# Patient Record
Sex: MALE | Race: BLACK OR AFRICAN AMERICAN | NOT HISPANIC OR LATINO | Employment: UNEMPLOYED | URBAN - METROPOLITAN AREA
[De-identification: names, ages, dates, MRNs, and addresses within clinical notes are randomized per-mention and may not be internally consistent; named-entity substitution may affect disease eponyms.]

---

## 2023-04-06 ENCOUNTER — HOSPITAL ENCOUNTER (EMERGENCY)
Facility: HOSPITAL | Age: 33
Discharge: OTHER INSTITUTION | End: 2023-04-07
Attending: EMERGENCY MEDICINE

## 2023-04-06 ENCOUNTER — APPOINTMENT (OUTPATIENT)
Dept: CT IMAGING | Facility: HOSPITAL | Age: 33
End: 2023-04-06

## 2023-04-06 DIAGNOSIS — R53.1 ACUTE LEFT-SIDED WEAKNESS: Primary | ICD-10-CM

## 2023-04-06 DIAGNOSIS — I61.9 INTRACEREBRAL HEMORRHAGE (HCC): ICD-10-CM

## 2023-04-06 LAB
ANION GAP SERPL CALCULATED.3IONS-SCNC: 10 MMOL/L (ref 4–13)
APTT PPP: 26 SECONDS (ref 23–37)
BUN SERPL-MCNC: 15 MG/DL (ref 5–25)
CALCIUM SERPL-MCNC: 9.2 MG/DL (ref 8.4–10.2)
CHLORIDE SERPL-SCNC: 101 MMOL/L (ref 96–108)
CO2 SERPL-SCNC: 24 MMOL/L (ref 21–32)
CREAT SERPL-MCNC: 1.23 MG/DL (ref 0.6–1.3)
ERYTHROCYTE [DISTWIDTH] IN BLOOD BY AUTOMATED COUNT: 14.2 % (ref 11.6–15.1)
ETHANOL SERPL-MCNC: <10 MG/DL
GFR SERPL CREATININE-BSD FRML MDRD: 76 ML/MIN/1.73SQ M
GLUCOSE SERPL-MCNC: 103 MG/DL (ref 65–140)
HCT VFR BLD AUTO: 47.6 % (ref 36.5–49.3)
HGB BLD-MCNC: 15.5 G/DL (ref 12–17)
INR PPP: 1.06 (ref 0.84–1.19)
MCH RBC QN AUTO: 26.8 PG (ref 26.8–34.3)
MCHC RBC AUTO-ENTMCNC: 32.6 G/DL (ref 31.4–37.4)
MCV RBC AUTO: 82 FL (ref 82–98)
PLATELET # BLD AUTO: 156 THOUSANDS/UL (ref 149–390)
PMV BLD AUTO: 12.3 FL (ref 8.9–12.7)
POTASSIUM SERPL-SCNC: 3.1 MMOL/L (ref 3.5–5.3)
PROTHROMBIN TIME: 13.6 SECONDS (ref 11.6–14.5)
RBC # BLD AUTO: 5.78 MILLION/UL (ref 3.88–5.62)
SODIUM SERPL-SCNC: 135 MMOL/L (ref 135–147)
WBC # BLD AUTO: 12.67 THOUSAND/UL (ref 4.31–10.16)

## 2023-04-06 RX ORDER — HYDRALAZINE HYDROCHLORIDE 20 MG/ML
10 INJECTION INTRAMUSCULAR; INTRAVENOUS ONCE
Status: COMPLETED | OUTPATIENT
Start: 2023-04-07 | End: 2023-04-07

## 2023-04-06 RX ORDER — FENTANYL CITRATE 50 UG/ML
50 INJECTION, SOLUTION INTRAMUSCULAR; INTRAVENOUS ONCE
Status: DISCONTINUED | OUTPATIENT
Start: 2023-04-07 | End: 2023-04-07 | Stop reason: HOSPADM

## 2023-04-06 RX ORDER — ONDANSETRON 2 MG/ML
4 INJECTION INTRAMUSCULAR; INTRAVENOUS ONCE
Status: COMPLETED | OUTPATIENT
Start: 2023-04-06 | End: 2023-04-06

## 2023-04-06 RX ORDER — LABETALOL HYDROCHLORIDE 5 MG/ML
10 INJECTION, SOLUTION INTRAVENOUS ONCE
Status: COMPLETED | OUTPATIENT
Start: 2023-04-06 | End: 2023-04-06

## 2023-04-06 RX ADMIN — LEVETIRACETAM 2000 MG: 100 INJECTION, SOLUTION INTRAVENOUS at 23:49

## 2023-04-06 RX ADMIN — ONDANSETRON 4 MG: 2 INJECTION INTRAMUSCULAR; INTRAVENOUS at 23:44

## 2023-04-06 RX ADMIN — LABETALOL HYDROCHLORIDE 10 MG: 5 INJECTION, SOLUTION INTRAVENOUS at 23:44

## 2023-04-06 RX ADMIN — SODIUM CHLORIDE 1 MG/HR: 0.9 INJECTION, SOLUTION INTRAVENOUS at 23:38

## 2023-04-07 ENCOUNTER — APPOINTMENT (EMERGENCY)
Dept: RADIOLOGY | Facility: HOSPITAL | Age: 33
End: 2023-04-07

## 2023-04-07 ENCOUNTER — APPOINTMENT (EMERGENCY)
Dept: CT IMAGING | Facility: HOSPITAL | Age: 33
End: 2023-04-07

## 2023-04-07 VITALS
HEART RATE: 94 BPM | RESPIRATION RATE: 16 BRPM | WEIGHT: 221.56 LBS | SYSTOLIC BLOOD PRESSURE: 231 MMHG | DIASTOLIC BLOOD PRESSURE: 144 MMHG | OXYGEN SATURATION: 98 %

## 2023-04-07 PROBLEM — I61.9 ICH (INTRACEREBRAL HEMORRHAGE) (HCC): Status: ACTIVE | Noted: 2023-04-07

## 2023-04-07 LAB
ATRIAL RATE: 97 BPM
CARDIAC TROPONIN I PNL SERPL HS: 41 NG/L
FLUAV RNA RESP QL NAA+PROBE: NEGATIVE
FLUBV RNA RESP QL NAA+PROBE: NEGATIVE
P AXIS: 64 DEGREES
PR INTERVAL: 142 MS
QRS AXIS: 65 DEGREES
QRSD INTERVAL: 82 MS
QT INTERVAL: 352 MS
QTC INTERVAL: 447 MS
RSV RNA RESP QL NAA+PROBE: NEGATIVE
SARS-COV-2 RNA RESP QL NAA+PROBE: NEGATIVE
T WAVE AXIS: -27 DEGREES
VENTRICULAR RATE: 97 BPM

## 2023-04-07 RX ORDER — ETOMIDATE 2 MG/ML
20 INJECTION INTRAVENOUS ONCE
Status: COMPLETED | OUTPATIENT
Start: 2023-04-07 | End: 2023-04-07

## 2023-04-07 RX ORDER — LABETALOL HYDROCHLORIDE 5 MG/ML
10 INJECTION, SOLUTION INTRAVENOUS ONCE
Status: COMPLETED | OUTPATIENT
Start: 2023-04-07 | End: 2023-04-07

## 2023-04-07 RX ORDER — LABETALOL HYDROCHLORIDE 5 MG/ML
INJECTION, SOLUTION INTRAVENOUS
Status: COMPLETED
Start: 2023-04-07 | End: 2023-04-07

## 2023-04-07 RX ORDER — PROPOFOL 10 MG/ML
5-50 INJECTION, EMULSION INTRAVENOUS
Status: DISCONTINUED | OUTPATIENT
Start: 2023-04-07 | End: 2023-04-07 | Stop reason: HOSPADM

## 2023-04-07 RX ORDER — HYDRALAZINE HYDROCHLORIDE 20 MG/ML
10 INJECTION INTRAMUSCULAR; INTRAVENOUS ONCE
Status: COMPLETED | OUTPATIENT
Start: 2023-04-07 | End: 2023-04-07

## 2023-04-07 RX ORDER — SUCCINYLCHOLINE/SOD CL,ISO/PF 100 MG/5ML
100 SYRINGE (ML) INTRAVENOUS ONCE
Status: COMPLETED | OUTPATIENT
Start: 2023-04-07 | End: 2023-04-07

## 2023-04-07 RX ORDER — SODIUM CHLORIDE 3 G/100ML
250 INJECTION, SOLUTION INTRAVENOUS ONCE
Status: DISCONTINUED | OUTPATIENT
Start: 2023-04-07 | End: 2023-04-07

## 2023-04-07 RX ADMIN — PROPOFOL 30 MCG/KG/MIN: 10 INJECTION, EMULSION INTRAVENOUS at 01:02

## 2023-04-07 RX ADMIN — SODIUM CHLORIDE 15 MG/HR: 0.9 INJECTION, SOLUTION INTRAVENOUS at 01:48

## 2023-04-07 RX ADMIN — LABETALOL HYDROCHLORIDE 10 MG: 5 INJECTION, SOLUTION INTRAVENOUS at 01:55

## 2023-04-07 RX ADMIN — LABETALOL HYDROCHLORIDE 10 MG: 5 INJECTION, SOLUTION INTRAVENOUS at 00:30

## 2023-04-07 RX ADMIN — HYDRALAZINE HYDROCHLORIDE 10 MG: 20 INJECTION INTRAMUSCULAR; INTRAVENOUS at 00:34

## 2023-04-07 RX ADMIN — HYDRALAZINE HYDROCHLORIDE 10 MG: 20 INJECTION INTRAMUSCULAR; INTRAVENOUS at 00:36

## 2023-04-07 RX ADMIN — Medication 100 MG: at 01:02

## 2023-04-07 RX ADMIN — ETOMIDATE 20 MG: 2 INJECTION, SOLUTION INTRAVENOUS at 01:02

## 2023-04-07 NOTE — ED PROVIDER NOTES
Pt Name: Frederick Fuller  MRN: [id number]  Emilygfgautam 1990  Age/Sex: 27 y o  male  Date of evaluation: 4/6/2023  PCP: No primary care provider on file  CHIEF COMPLAINT    Chief Complaint   Patient presents with   • Fall     Pt arrives via EMS complaining of new onset left sided weakness, pt reports falling at home because of the numbness, Pre hospital stroke alert called, went to CT right away and then found out pt fell, Activating trauma instead per provider  HPI and MDM    27 y o  male presenting with left-sided weakness and dysarthria  Significant other present with patient, patient presents via EMS  Per EMS initially he was able to somewhat move his left arm but throughout transport he had worsening weakness  Per significant other, symptoms started between 8 and 9 PM earlier tonight  His left side got weak and numb and then he fell onto the ground, did hit the right side of his head  He had been drinking  No vomiting  No recent illnesses  Patient has a history of high blood pressure, is on metoprolol, losartan and amlodipine but has not taken it for the last few months  Differential diagnosis considered includes but not limited to acute CVA, large vessel occlusion, dissection, intracerebral hemorrhage  Patient was examined as soon as he came through the emergency department, he had no effort against gravity in both left upper and lower extremities, and significant dysarthria, however GCS 15  He was immediately sent to CT scanner for stroke scans  I discussed case with stroke neurologist, Dr Juan Daniel Willis, he reviewed CT scans, patient has large right intracerebral hemorrhage and small left intracerebral hemorrhage  Initially discussed this with trauma given patient had a fall, however this is likely medical, likely hypertensive bleed that caused the fall  Trauma recommending neuro critical care  Stroke neurology in agreement      I discussed case with neuro critical care attending,   KUSHAL, who agrees with transfer  Recommended to keep systolic blood pressure less than 140, patient being given IV labetalol and started on a Cardene drip  I discussed results and plan with patient and significant other  ED Course as of 04/07/23 0205   Fri Apr 07, 2023   0113 Flight crew at bedside, with somewhat worse somnolence however, still arouses to verbal stimuli, does answer questions appropriately, knows where he is and his name  Flight crew requesting intubation for transport due to potential clinical course deteriorating, therefore patient was intubated  I updated neuro critical care attending, Dr Ann-Marie Stevenson, who is requesting repeat CT head, therefore it is ordered  0123 XR chest 1 view portable  Per my independent interpretation of CXR, good placement of ETT, no pneumothorax or pneumonia  7986 Dr Ann-Marie Stevenson reviewed repeat CT head, recommending Hypertonic bolus 250 mL  Flight crew able to administer en route  Medications   niCARdipine (CARDENE) 25 mg (STANDARD CONCENTRATION) in sodium chloride 0 9% 250 mL (has no administration in time range)   levETIRAcetam (KEPPRA) 2,000 mg in sodium chloride 0 9 % 250 mL IVPB (has no administration in time range)   labetalol (NORMODYNE) injection 10 mg (has no administration in time range)         Past Medical and Surgical History    History reviewed  No pertinent past medical history  History reviewed  No pertinent surgical history  History reviewed  No pertinent family history               Allergies    No Known Allergies    Home Medications    Prior to Admission medications    Not on File           Physical Exam      ED Triage Vitals [04/06/23 2332]   Temp Pulse Respirations Blood Pressure SpO2   -- (!) 118 20 (!) 205/153 98 %      Temp src Heart Rate Source Patient Position - Orthostatic VS BP Location FiO2 (%)   -- Monitor Sitting Right arm --      Pain Score       10 - Worst Possible Pain               Physical Exam         Diagnostic Results      Labs:    Results Reviewed     Procedure Component Value Units Date/Time    Protime-INR [[id number]] Collected: 04/06/23 2336    Lab Status: In process Specimen: Blood from Arm, Right Updated: 04/06/23 2339    APTT [[id number]] Collected: 04/06/23 2336    Lab Status: In process Specimen: Blood from Arm, Right Updated: 04/06/23 2339    HS Troponin 0hr (reflex protocol) [[id number]] Collected: 04/06/23 2336    Lab Status: In process Specimen: Blood from Arm, Right Updated: 04/06/23 8173    Basic metabolic panel [[id number]] Collected: 04/06/23 2336    Lab Status: In process Specimen: Blood from Arm, Right Updated: 04/06/23 2339    Ethanol [[id number]] Collected: 04/06/23 2336    Lab Status: In process Specimen: Blood from Arm, Right Updated: 04/06/23 2339    CBC and Platelet [[id number]] Collected: 04/06/23 2336    Lab Status: In process Specimen: Blood from Arm, Right Updated: 04/06/23 2339    FLU/RSV/COVID - if FLU/RSV clinically relevant [[id number]] Collected: 04/06/23 2336    Lab Status:  In process Specimen: Nares from Nose Updated: 04/06/23 2339          All labs reviewed and utilized in the medical decision making process    Radiology:    CT stroke alert brain    (Results Pending)   CTA stroke alert (head/neck)    (Results Pending)       All radiology studies independently viewed by me and interpreted by the radiologist     Procedure    CriticalCare Time    Date/Time: 4/7/2023 2:05 AM  Performed by: Nestor Isbell DO  Authorized by: Nestor Isbell DO     Critical care provider statement:     Critical care time (minutes):  75    Critical care was necessary to treat or prevent imminent or life-threatening deterioration of the following conditions:  CNS failure or compromise and respiratory failure    Critical care was time spent personally by me on the following activities:  Obtaining history from patient or surrogate, development of treatment plan with patient or surrogate, discussions with consultants, evaluation of patient's response to treatment, examination of patient, re-evaluation of patient's condition, ordering and review of radiographic studies, ordering and review of laboratory studies and ordering and performing treatments and interventions            FINAL IMPRESSION    Final diagnoses:   Acute left-sided weakness         DISPOSITION    Time reflects when diagnosis was documented in both MDM as applicable and the Disposition within this note     Time User Action Codes Description Comment    4/6/2023 11:17 PM Zena Garduno Juan [R53 1] Acute left-sided weakness       ED Disposition     None      Follow-up Information    None           PATIENT REFERRED TO:    No follow-up provider specified  DISCHARGE MEDICATIONS:    Patient's Medications    No medications on file       No discharge procedures on file  Ewa Pompa DO        This note was partially completed using voice recognition technology, and was scanned for gross errors; however some errors may still exist  Please contact the author with any questions or requests for clarification  region in the E-gene was chosen  for pan-Sarbecovirus detection which includes SARS-CoV-2  According to CMS-2020-01-R, this platform meets the definition of high-throughput technology      HS Troponin 0hr (reflex protocol) [[id number]]  (Normal) Collected: 04/06/23 2336    Lab Status: Final result Specimen: Blood from Arm, Right Updated: 04/07/23 0003     hs TnI 0hr 41 ng/L     Ethanol [[id number]]  (Normal) Collected: 04/06/23 2336    Lab Status: Final result Specimen: Blood from Arm, Right Updated: 04/06/23 2358     Ethanol Lvl <10 mg/dL     Basic metabolic panel [[id number]]  (Abnormal) Collected: 04/06/23 2336    Lab Status: Final result Specimen: Blood from Arm, Right Updated: 04/06/23 2357     Sodium 135 mmol/L      Potassium 3 1 mmol/L      Chloride 101 mmol/L      CO2 24 mmol/L      ANION GAP 10 mmol/L      BUN 15 mg/dL      Creatinine 1 23 mg/dL      Glucose 103 mg/dL      Calcium 9 2 mg/dL      eGFR 76 ml/min/1 73sq m     Narrative:      [place] guidelines for Chronic Kidney Disease (CKD):   •  Stage 1 with normal or high GFR (GFR > 90 mL/min/1 73 square meters)  •  Stage 2 Mild CKD (GFR = 60-89 mL/min/1 73 square meters)  •  Stage 3A Moderate CKD (GFR = 45-59 mL/min/1 73 square meters)  •  Stage 3B Moderate CKD (GFR = 30-44 mL/min/1 73 square meters)  •  Stage 4 Severe CKD (GFR = 15-29 mL/min/1 73 square meters)  •  Stage 5 End Stage CKD (GFR <15 mL/min/1 73 square meters)  Note: GFR calculation is accurate only with a steady state creatinine    Protime-INR [[id number]]  (Normal) Collected: 04/06/23 2336    Lab Status: Final result Specimen: Blood from Arm, Right Updated: 04/06/23 2353     Protime 13 6 seconds      INR 1 06    APTT [[id number]]  (Normal) Collected: 04/06/23 2336    Lab Status: Final result Specimen: Blood from Arm, Right Updated: 04/06/23 2353     PTT 26 seconds     CBC and Platelet [[id number]]  (Abnormal) Collected: 04/06/23 2336    Lab Status: Final result Specimen: Blood from Arm, Right Updated: 04/06/23 2342     WBC 12 67 Thousand/uL      RBC 5 78 Million/uL      Hemoglobin 15 5 g/dL      Hematocrit 47 6 %      MCV 82 fL      MCH 26 8 pg      MCHC 32 6 g/dL      RDW 14 2 %      Platelets 251 Thousands/uL      MPV 12 3 fL           All labs reviewed and utilized in the medical decision making process    Radiology:    XR chest 1 view portable   Final Result      No acute cardiopulmonary disease  Workstation performed: [id number]         CT head without contrast   Final Result      No significant interval change compared to the earlier study  Redemonstration of acute parenchymal hematoma centered in the right basal ganglia/capsular thalamic region with minimal adjacent satellite hemorrhage and surrounding vasogenic edema  Redemonstrated curvilinear? Focus of hemorrhage versus underlying lesion left frontoparietal subcortical white matter  Stable associated mass effect including partial effacement of the right lateral ventricle  No evidence of hydrocephalus or intraventricular extension at present  Workstation performed: [id number]         CTA stroke alert (head/neck)   Final Result         1  No evidence of active bleeding within the right temporal hematoma  No findings to suggest avascular malformation or aneurysm  2   No stenosis, dissection or occlusion of the carotid or vertebral arteries or major vessels of the [place]  Findings were directly discussed with Liza Muñiz at 11:40 PM                            Workstation performed: [id number]         CT stroke alert brain   Final Result         1  Acute hematoma in the right temporal and possible thalamocapsular region extending to the atrium of the right lateral ventricle measuring 4 x 2 1 x 2 4 cm  Minimal surrounding hemorrhage  No midline shift     2   Questionable curvilinear focus of hemorrhage versus vascular lesion in the left frontoparietal region  3   Partial effacement of the right lateral ventricle  No hydrocephalus  Findings were directly discussed with Luis Noriega at 11:40 PM       Workstation performed: [id number]             All radiology studies independently viewed by me and interpreted by the radiologist     Procedure    CriticalCare Time    Date/Time: 4/7/2023 2:05 AM  Performed by: Genny Ocampo DO  Authorized by: Genny Ocampo DO     Critical care provider statement:     Critical care time (minutes):  75    Critical care was necessary to treat or prevent imminent or life-threatening deterioration of the following conditions:  CNS failure or compromise and respiratory failure    Critical care was time spent personally by me on the following activities:  Obtaining history from patient or surrogate, development of treatment plan with patient or surrogate, discussions with consultants, evaluation of patient's response to treatment, examination of patient, re-evaluation of patient's condition, ordering and review of radiographic studies, ordering and review of laboratory studies and ordering and performing treatments and interventions            FINAL IMPRESSION    Final diagnoses:   Acute left-sided weakness   Intracerebral hemorrhage ([address] )         DISPOSITION    Time reflects when diagnosis was documented in both MDM as applicable and the Disposition within this note     Time User Action Codes Description Comment    4/6/2023 11:17 PM Karina Second Add [R53 1] Acute left-sided weakness     4/7/2023  4:11 AM Karina Second Add [I61 9] Intracerebral hemorrhage [facility])       ED Disposition     ED Disposition   Transfer to Another Facility-In Network    Condition   --    Date/Time   Thu Apr 6, 2023 11:48 PM    Comment   Zaid Dodd should be transferred out to B             MD Documentation    Flowsheet Row Most Recent Value   Patient Condition The patient has been stabilized such that within reasonable medical probability, no material deterioration of the patient condition or the condition of the unborn child(vee) is likely to result from the transfer   Reason for Transfer Level of Care needed not available at this facility   Benefits of Transfer Specialized equipment and/or services available at the receiving facility (Include comment)________________________   Risks of Transfer Potential for delay in receiving treatment, Potential deterioration of medical condition, Loss of IV, Increased discomfort during transfer, Possible worsening of condition or death during transfer   Accepting Physician Jagruti Paredes MD, Dr   Provider Certification General risk, such as traffic hazards, adverse weather conditions, rough terrain or turbulence, possible failure of equipment (including vehicle or aircraft), or consequences of actions of persons outside the control of the transport personnel, Unanticipated needs of medical equipment and personnel during transport, Risk of worsening condition, The possibility of a transport vehicle being unavailable      RN Documentation    72 Jagruti Sharp      Follow-up Information    None           PATIENT REFERRED TO:    No follow-up provider specified  DISCHARGE MEDICATIONS:    There are no discharge medications for this patient  No discharge procedures on file  Inocencio Gonzalez DO        This note was partially completed using voice recognition technology, and was scanned for gross errors; however some errors may still exist  Please contact the author with any questions or requests for clarification        Inocencio Gonzalez DO  04/16/23 4025

## 2023-04-07 NOTE — RESPIRATORY THERAPY NOTE
Called to ED 13 for intubation of patient  with noted brain bleed being intubated for airway protection and being tranfered to [address] [place] [address] for further evaluation  Patient given hyper oxygenation prior to intubation intubated by attending with size 8, 24 at the teeth positive ETCO2 detected equal bilateral breath sounds heard  ETCO2 reading 42 et tube secure with anchor fast device tie tension adequate no evidence of skin breakdown noted at this time  flight crew in room placed on transport vent vt 620 rate 14 peep 6 fio2 50%  Patient taken to Ct scan for re scan of head  Patient maintained on mechanical ventilation with flight transport  Vital signs stable  Patient being sedated without apparent respiratory distress  Plan: continue current support  Patient being transported to [address] [place] [address] via aircraft

## 2023-04-07 NOTE — QUICK NOTE
Called regarding transfer of 27 YOM who presents with history of hypertension, elevated NIH stroke scale and concern for IPH  Patient reportedly did fall but fell after feeling numbness, likely secondary to hypertensive bleed  Patient does have large IPH but does not have any SDH or visualized traumatic appearing SAH  Given presentation, despite fall, this is likely hypertensive bleed  Patient reportedly without evidence of head trauma  Recommend priority 1 transfer for [place] team and activation of stroke alert pathway

## 2023-04-07 NOTE — STROKE DOCUMENTATION
100mg of slim and 100mg of fentanyl given by [facility]  Propafol increased to 75ml/hr due to increased agitation

## 2023-04-07 NOTE — EMTALA/ACUTE CARE TRANSFER
[address] [facility] [address]  [address]  [place] [address]  Dept: [phone]      EMTALA TRANSFER CONSENT    NAME Yinka Lawson 1990                              MRN [id number]    I have been informed of my rights regarding examination, treatment, and transfer   by Dr Gina Yi,     Benefits: Specialized equipment and/or services available at the receiving facility (Include comment)________________________    Risks: Potential for delay in receiving treatment, Potential deterioration of medical condition, Loss of IV, Increased discomfort during transfer, Possible worsening of condition or death during transfer      Consent for Transfer:  I acknowledge that my medical condition has been evaluated and explained to me by the emergency department physician or other qualified medical person and/or my attending physician, who has recommended that I be transferred to the service of  Accepting Physician: Dr Patricia May at [facility] Name, [address] : [facility]  The above potential benefits of such transfer, the potential risks associated with such transfer, and the probable risks of not being transferred have been explained to me, and I fully understand them  The doctor has explained that, in my case, the benefits of transfer outweigh the risks  I agree to be transferred  I authorize the performance of emergency medical procedures and treatments upon me in both transit and upon arrival at the receiving facility  Additionally, I authorize the release of any and all medical records to the receiving facility and request they be transported with me, if possible  I understand that the safest mode of transportation during a medical emergency is an ambulance and that the Hospital advocates the use of this mode of transport   Risks of traveling to the receiving facility by car, including absence of medical control, life sustaining equipment, such as oxygen, and medical personnel has been explained to me and I fully understand them  (BRANDON CORRECT BOX BELOW)  [  ]  I consent to the stated transfer and to be transported by ambulance/helicopter  [  ]  I consent to the stated transfer, but refuse transportation by ambulance and accept full responsibility for my transportation by car  I understand the risks of non-ambulance transfers and I exonerate the Hospital and its staff from any deterioration in my condition that results from this refusal     X___________________________________________    DATE  04/06/23  TIME________  Signature of patient or legally responsible individual signing on patient behalf           RELATIONSHIP TO PATIENT_________________________          Provider Certification    NAME Yinka Agrawal Confer 1990                              MRN [id number]    A medical screening exam was performed on the above named patient  Based on the examination:    Condition Necessitating Transfer The encounter diagnosis was Acute left-sided weakness      Patient Condition: The patient has been stabilized such that within reasonable medical probability, no material deterioration of the patient condition or the condition of the unborn child(vee) is likely to result from the transfer    Reason for Transfer: Level of Care needed not available at this facility    Transfer Requirements: [place]   · Space available and qualified personnel available for treatment as acknowledged by    · Agreed to accept transfer and to provide appropriate medical treatment as acknowledged by       Dr Soni Duque  · Appropriate medical records of the examination and treatment of the patient are provided at the time of transfer   [address], [address] _______  · Transfer will be performed by qualified personnel from    and appropriate transfer equipment as required, including the use of necessary and appropriate life support measures  Provider Certification: I have examined the patient and explained the following risks and benefits of being transferred/refusing transfer to the patient/family:  General risk, such as traffic hazards, adverse weather conditions, rough terrain or turbulence, possible failure of equipment (including vehicle or aircraft), or consequences of actions of persons outside the control of the transport personnel, Unanticipated needs of medical equipment and personnel during transport, Risk of worsening condition, The possibility of a transport vehicle being unavailable      Based on these reasonable risks and benefits to the patient and/or the unborn child(vee), and based upon the information available at the time of the patient’s examination, I certify that the medical benefits reasonably to be expected from the provision of appropriate medical treatments at another medical facility outweigh the increasing risks, if any, to the individual’s medical condition, and in the case of labor to the unborn child, from effecting the transfer      X____________________________________________ DATE 04/06/23        TIME_______      ORIGINAL - SEND TO MEDICAL RECORDS   COPY - SEND WITH PATIENT DURING TRANSFER

## 2023-04-07 NOTE — QUICK NOTE
Alexandre Sampson          Assessment/Plan   Assessment: Valencia Linda is a 68-year-old man who presents with what is likely a hypertensive right greater than left intracerebral hemorrhage    TNK Decision: Patient not a candidate  Bleeding risk  Plan: -Admit using intracerebral hemorrhage order set  -Contact neuro critical care for decision making with regard to admission destination  -Maintain systolic blood pressure less than 140  -No anticoagulants of any kind for now  -Recommend checking platelet levels along with coagulation studies  If he is found to have intrinsic coagulopathy consider reversal  Julio PT/OT/SP    History of Present Illness     Reason for Consult / Principal Problem: Stroke Alert  Hx and PE limited by: none  Patient last known well:  04/06/23  Stroke alert called: 2317 04/06/23  Neurology time of arrival: 2317 04/06/23    HPI: Alexandre Sampson is a 27 y o  male who presents as a stroke alert  He has a reported history of hypertension but has not been taking his medicine for the last few months  Earlier this evening he was noted to have acute onset of left sided weakness  NIH stroke scale is 11  I personally reviewed his noncontrast head CT which reveals evidence of a relatively large intracerebral hemorrhage on the right-hand side potentially centered in the basal ganglia/thalamic capsular region  There is also a potential tiny hemorrhage on the left-hand side higher up (image 32)  There is some calcification in the basal ganglia consistent with hypertension  My review of the CT angiography does not reveal any large vessel occlusion although there might be a tiny spot sign in the hemorrhage bed      Reportedly he had been drinking some alcohol earlier but based on the size and location of the hemorrhage and the lack of external hematoma or obvious cortical involvement would suggest that this is not a traumatic bleed    History reviewed  No pertinent past medical history      Social History     Socioeconomic History   • Marital status: Single     Spouse name: Not on file   • Number of children: Not on file   • Years of education: Not on file   • Highest education level: Not on file   Occupational History   • Not on file   Tobacco Use   • Smoking status: Not on file   • Smokeless tobacco: Not on file   Substance and Sexual Activity   • Alcohol use: Not on file   • Drug use: Not on file   • Sexual activity: Not on file   Other Topics Concern   • Not on file   Social History Narrative   • Not on file     Social Determinants of Health     Financial Resource Strain: Not on file   Food Insecurity: Not on file   Transportation Needs: Not on file   Physical Activity: Not on file   Stress: Not on file   Social Connections: Not on file   Intimate Partner Violence: Not on file   Housing Stability: Not on file       Meds/Allergies   PTA meds:   None         Patient Vitals for the past 24 hrs:   BP Pulse Resp SpO2 Weight   04/06/23 2332 (!) 205/153 (!) 118 20 98 % 101 kg (221 lb 9 oz)

## 2023-04-07 NOTE — ED PROCEDURE NOTE
Procedure  Arterial Line    Date/Time: 4/7/2023 12:30 AM  Performed by: Terri Colunga PA-C  Authorized by: Terri Colunga PA-C     Patient location:  ED and bedside  Other Assisting Provider: No    Consent:     Consent obtained:  Emergent situation  Universal protocol:     Patient identity confirmed:  Arm band and provided demographic data  Indications:     Indications: hemodynamic monitoring and continuous blood pressure monitoring    Pre-procedure details:     Skin preparation:  Betadine    Preparation: Patient was prepped and draped in sterile fashion    Anesthesia (see MAR for exact dosages): Anesthesia method:  None  Procedure details:     Location / Tip of Catheter:  Radial    Laterality:  Left    Wu's test performed: yes      Wu's test abnormal: no      Needle gauge:  20 G    Placement technique:  Percutaneous and Seldinger    Number of attempts:  1    Successful placement: yes      Transducer: waveform confirmed    Post-procedure details:     Post-procedure:  Biopatch applied and wrist guard applied    CMS:  Unchanged    Patient tolerance of procedure:   Tolerated well, no immediate complications                     Terri Colunga PA-C  04/07/23 0113       Terri Colunga PA-C  04/07/23 4224

## 2023-04-07 NOTE — ED PROCEDURE NOTE
Procedure  Intubation    Date/Time: 4/7/2023 12:55 AM  Performed by: Ashly Love PA-C  Authorized by: Ashly Love PA-C     Patient location:  ED  Other Assisting Provider: No    Consent:     Consent obtained:  Emergent situation  Universal protocol:     Patient identity confirmed:  Arm band and provided demographic data  Pre-procedure details:     Patient status:  Altered mental status    Mallampati score:  3    Pretreatment medications:  Etomidate    Paralytics:  Succinylcholine  Indications:     Indications for intubation: airway protection    Procedure details:     Preoxygenation:  Bag valve mask    CPR in progress: no      Intubation method:  Oral    Oral intubation technique: Freire Mac 4  Laryngoscope blade: Mac 4    Tube size (mm):  8 0    Tube type:  Cuffed and hi-lo    Number of attempts:  1    Cricoid pressure: no      Tube visualized through cords: yes    Placement assessment:     ETT to teeth:  24    Tube secured with:  ETT juarez    Breath sounds:  Equal and absent over the epigastrium    Placement verification: chest rise, condensation, colorimetric ETCO2 device, CXR verification, direct visualization, equal breath sounds and ETCO2 detector      CXR findings:  ETT in proper place  Post-procedure details:     Patient tolerance of procedure:   Tolerated well, no immediate complications                     Ashly Love PA-C  04/07/23 [id number]  04/07/23 0120

## 2023-04-10 PROBLEM — G93.40 ENCEPHALOPATHY: Status: ACTIVE | Noted: 2023-04-10

## 2023-04-10 PROBLEM — D72.829 LEUKOCYTOSIS: Status: ACTIVE | Noted: 2023-04-10

## 2023-04-10 PROBLEM — I10 HYPERTENSION: Status: ACTIVE | Noted: 2023-04-10

## 2023-04-10 PROBLEM — Z72.0 TOBACCO USE: Status: ACTIVE | Noted: 2023-04-10

## 2023-04-11 PROBLEM — R56.9 WITNESSED SEIZURE-LIKE ACTIVITY (HCC): Status: ACTIVE | Noted: 2023-04-11

## 2023-04-20 ENCOUNTER — TELEPHONE (OUTPATIENT)
Dept: NEUROLOGY | Facility: CLINIC | Age: 33
End: 2023-04-20

## 2023-04-20 NOTE — TELEPHONE ENCOUNTER
Isadore Brunner called back for Berenice Johnson  Please call Isadore Brunner back      Isadore Brunner contact #: [phone]

## 2023-04-20 NOTE — TELEPHONE ENCOUNTER
Called the alterative phone number which happens to be the patient's significant other, Leonel Denise, phone number  She confirmed how she is with the patient now at the rehab  Inquired if the patient wishes to follow up with [facility]y or neurology closer to home  Patient opted to follow up with a neurologist closer to home since he lives in [facility]  Advised how the recommendation was for the patient to follow up with neurology in 4-6 weeks  Expressed understanding and they were appreciative

## 2023-04-20 NOTE — TELEPHONE ENCOUNTER
Neurovascular Discharge Follow Up  Hospitalization: 4/7/23-4/14/23    According to chart, patient discharged to [facility] for [address]  TT sent to Dr Elysia Lanza for clarification of follow up instructions

## 2023-04-20 NOTE — TELEPHONE ENCOUNTER
Discussed with management, Swathi  She approved for this RN to reach out to the sister listed on file to see if there is an alternative phone number we can call the patient  Called sister Hines  No answer  Left a brief voice message requesting for a return call  Provided the office's phone number  *If [place] returns the call, please send the message to this RN

## 2023-04-20 NOTE — TELEPHONE ENCOUNTER
Neurovascular Discharge Follow Up  Hospitalization: 4/7/23-4/14/23    Dr Kristen Bird confirmed via TT for a 4-6 week epilepsy follow up with [facility] or attending  No NV follow up is needed at this time  [facility] for [facility]  Spoke with the nurse  She reports the patient is doing okay  Denies any new seizure-like activity  Denies any new or worsening stroke-like symptoms since discharge  Reports the patient is alert and oriented X 3  Ambulation / ADLs:  Patient mobilizing with therapy  He is a contact guard  He continues to require assistance with ADLs  Patient maintained on a regular consistency diet  He remains continent at this time  Reports his BP is typically around 120s/80s  Appointments:  Patient lives in [place], [place]  Will call patient directly to see if he is interested to follow up with  neurology or if he plans on following up closer to home  There is no estimated discharge date at this time

## 2023-04-20 NOTE — TELEPHONE ENCOUNTER
Called patient's primary phone number listed in the chart  Unfortunately, received a dial tone stating the customer cannot receive calls at this time

## 2023-04-20 NOTE — TELEPHONE ENCOUNTER
Returned the call  Requested for an alternative number to reach patient  She provided the alternative phone number of [phone]